# Patient Record
Sex: FEMALE | Race: ASIAN | Employment: UNEMPLOYED | URBAN - METROPOLITAN AREA
[De-identification: names, ages, dates, MRNs, and addresses within clinical notes are randomized per-mention and may not be internally consistent; named-entity substitution may affect disease eponyms.]

---

## 2020-10-20 ENCOUNTER — TELEPHONE (OUTPATIENT)
Dept: CARDIOLOGY CLINIC | Age: 20
End: 2020-10-20

## 2020-10-20 NOTE — TELEPHONE ENCOUNTER
Pt inquiring if documents from her pcp Dr. Delia Sr has been received.  Please advise    [phone]  Phone:

## 2020-10-20 NOTE — TELEPHONE ENCOUNTER
Returned patient's call, 2 pt identifiers used    Advised patient we have received last office note and a monitor report.        Future Appointments   Date Time Provider Dwayne Mercado   11/17/2020  8:40 AM MD NUNU Best AMB

## 2020-11-17 ENCOUNTER — OFFICE VISIT (OUTPATIENT)
Dept: CARDIOLOGY CLINIC | Age: 20
End: 2020-11-17
Payer: COMMERCIAL

## 2020-11-17 VITALS
WEIGHT: 121.6 LBS | OXYGEN SATURATION: 98 % | SYSTOLIC BLOOD PRESSURE: 136 MMHG | BODY MASS INDEX: 20.26 KG/M2 | HEIGHT: 65 IN | HEART RATE: 78 BPM | DIASTOLIC BLOOD PRESSURE: 82 MMHG

## 2020-11-17 DIAGNOSIS — R00.2 PALPITATIONS: ICD-10-CM

## 2020-11-17 DIAGNOSIS — I47.1 SVT (SUPRAVENTRICULAR TACHYCARDIA) (HCC): Primary | ICD-10-CM

## 2020-11-17 PROCEDURE — 99243 OFF/OP CNSLTJ NEW/EST LOW 30: CPT | Performed by: INTERNAL MEDICINE

## 2020-11-17 NOTE — PROGRESS NOTES
Reason for consult: SVT  Requesting physician: Dr. Esau Roldan    HPI: Mayte Charlton, a 23y.o. year-old who presents for evaluation of SVT. Has had palpitations since the fall of 2018  It has become more frequent in the last few months  Episodes can last a few seconds up to 30 minutes  Sometimes she feels her heart pause and might have a brief sensation of chest pressure but it resolves quickly   She denies any dizziness or syncope  She rarely drinks caffeine, no OTC cold/allergy medications, no energy drinks or vitamins  Sleeps 8 hours/night   Stress level a little above average due to school  She denies any dyspnea with exertion or PND  She denies any LE edema  Exercises 3-4 days/week, does HIT workouts, takes walks or jogs, does some strength training  Living on campus at [facility] right now but her family lives in [place]   She is pre-med    Today we spent some time reviewing SVT contributions and precipitators as well as high risk features and when to worry. At this time no treatment is indicated given her underlying bradycardia 40 on her monitor as well as the lack of high risk features syncope etc.  We reviewed ablation as a potential cure for her symptoms if they intensify but will defer any evaluation for ablation at this time. She is well compensated on exam able to exercise without difficulty and has not ever passed out    Assessment/Plan:  1.   SVT - noted on loop monitor 9/20, discussed treatment options which includes medication for suppression and catheter ablation, her loop monitor showed bradycardia down to the 40s so I would not recommend suppression medication at this time  -discussed proceeding with EP consultation to discuss catheter ablation but she does not have any high risk features at this time and the palpitations are not bothering her enough right now for her to want to proceed with a catheter ablation  -discussed symptoms for which to call the office and report   -discussed vagal manuevers for longer episodes  -will order TTE to assess EF and valves   -TSH, K and Mg WNL  -she will follow here PRN    Loop Monitor 9/20 - NSR  bpm, 11 SVT runs, fastest run 167 bpm for 11 beats, longest run was 14 beats at 140 bpm    Fam Hx: no early CAD or arrhythmias   Soc Hx: no tobacco use, no etoh use    She  has no past medical history on file. Cardiovascular ROS: positive for palpitations, no exertional chest pain or dyspnea on exertion  Respiratory ROS: no cough, shortness of breath, or wheezing  Neurological ROS: no TIA or stroke symptoms  All other systems negative except as above. PE  Vitals:    11/17/20 0856   BP: 136/82   Pulse: 78   SpO2: 98%   Weight: 121 lb 9.6 oz (55.2 kg)   Height: 5' 5\" (1.651 m)    Body mass index is 20.24 kg/m².    General appearance - alert, well appearing, and in no distress  Mental status - affect appropriate to mood  Eyes - sclera anicteric, moist mucous membranes  Neck - supple  Lymphatics - not assessed  Chest - clear to auscultation, no wheezes, rales or rhonchi  Heart - normal rate, regular rhythm, normal S1, S2, no murmurs, rubs, clicks or gallops  Abdomen - soft, nontender, nondistended  Back exam - full range of motion, no tenderness  Neurological - cranial nerves II through XII grossly intact, no focal deficit  Musculoskeletal - no muscular tenderness noted, normal strength  Extremities - peripheral pulses normal, no pedal edema  Skin - normal coloration  no rashes    12 lead ECG: NSR     Recent Labs:  No results found for: CHOL, CHOLX, CHLST, CHOLV, 020913, HDL, HDLP, LDL, LDLC, DLDLP, TGLX, TRIGL, TRIGP, CHHD, CHHDX  No results found for: YOSHI, ACREA, CREA, REFC3, REFC4  No results found for: BUN, IBUN, MBUNV, BUNV  No results found for: K, KI, PLK, WBK  No results found for: HBA1C, HGBE8, QUL9BYKN, GHW7TSXL  No results found for: HGB, HGBP, HGBEXT, HGBEXT  No results found for: PLT, PLTEXT, PLTEXT    Reviewed:  No past medical history on file.  Social History     Tobacco Use   Smoking Status Never Smoker   Smokeless Tobacco Never Used     Social History     Substance and Sexual Activity   Alcohol Use Never    Frequency: Never     Allergies no known allergies    No current outpatient medications on file. No current facility-administered medications for this visit.         Brissa Garg MD  [facility] [address] [place] [address] [address], [address] [address]  [facility]  [phone]

## 2020-11-17 NOTE — LETTER
11/17/20 Patient: Aston Fowler YOB: 2000 Date of Visit: 11/17/2020 Heidy Wallace MD 
[facility] [address] VIA Facsimile: [phone] Dear Heidy Wallace MD, Thank you for referring Ms. Aston Fowler to [facility] for evaluation. My notes for this consultation are attached. If you have questions, please do not hesitate to call me. I look forward to following your patient along with you. Sincerely, Jonathan Verdugo MD

## 2020-12-21 ENCOUNTER — ANCILLARY PROCEDURE (OUTPATIENT)
Dept: CARDIOLOGY CLINIC | Age: 20
End: 2020-12-21
Payer: COMMERCIAL

## 2020-12-21 VITALS — HEIGHT: 65 IN | BODY MASS INDEX: 20.16 KG/M2 | WEIGHT: 121 LBS

## 2020-12-21 DIAGNOSIS — R00.2 PALPITATIONS: ICD-10-CM

## 2020-12-21 DIAGNOSIS — I47.1 SVT (SUPRAVENTRICULAR TACHYCARDIA) (HCC): ICD-10-CM

## 2020-12-21 LAB
ECHO AO ASC DIAM: 2.17 CM
ECHO AO ROOT DIAM: 2.33 CM
ECHO AV AREA PEAK VELOCITY: 2.56 CM2
ECHO AV AREA VTI: 2.54 CM2
ECHO AV AREA/BSA PEAK VELOCITY: 1.6 CM2/M2
ECHO AV AREA/BSA VTI: 1.6 CM2/M2
ECHO AV MEAN GRADIENT: 6.33 MMHG
ECHO AV PEAK GRADIENT: 11.75 MMHG
ECHO AV PEAK VELOCITY: 171.39 CM/S
ECHO AV VTI: 34.29 CM
ECHO LA AREA 4C: 12.93 CM2
ECHO LA MAJOR AXIS: 3.05 CM
ECHO LA MINOR AXIS: 1.91 CM
ECHO LA VOL 2C: 32.41 ML (ref 22–52)
ECHO LA VOL 4C: 28.32 ML (ref 22–52)
ECHO LA VOL BP: 36.83 ML (ref 22–52)
ECHO LA VOL/BSA BIPLANE: 23.05 ML/M2 (ref 16–28)
ECHO LA VOLUME INDEX A2C: 20.29 ML/M2 (ref 16–28)
ECHO LA VOLUME INDEX A4C: 17.73 ML/M2 (ref 16–28)
ECHO LV E' LATERAL VELOCITY: 22.21 CM/S
ECHO LV E' SEPTAL VELOCITY: 12.12 CM/S
ECHO LV INTERNAL DIMENSION DIASTOLIC: 4.38 CM (ref 3.9–5.3)
ECHO LV INTERNAL DIMENSION SYSTOLIC: 2.64 CM
ECHO LV IVSD: 0.73 CM (ref 0.6–0.9)
ECHO LV MASS 2D: 99.8 G (ref 67–162)
ECHO LV MASS INDEX 2D: 62.5 G/M2 (ref 43–95)
ECHO LV POSTERIOR WALL DIASTOLIC: 0.77 CM (ref 0.6–0.9)
ECHO LVOT DIAM: 2.14 CM
ECHO LVOT PEAK GRADIENT: 5.94 MMHG
ECHO LVOT PEAK VELOCITY: 121.82 CM/S
ECHO LVOT SV: 87 ML
ECHO LVOT VTI: 24.2 CM
ECHO MV A VELOCITY: 44.83 CM/S
ECHO MV AREA PHT: 3.98 CM2
ECHO MV E DECELERATION TIME (DT): 190.67 MS
ECHO MV E VELOCITY: 135.95 CM/S
ECHO MV E/A RATIO: 3.03
ECHO MV E/E' LATERAL: 6.12
ECHO MV E/E' RATIO (AVERAGED): 8.67
ECHO MV E/E' SEPTAL: 11.22
ECHO MV PRESSURE HALF TIME (PHT): 55.29 MS
ECHO RA AREA 4C: 10.4 CM2
ECHO RA MAJOR AXIS: 3.32 CM
ECHO RV INTERNAL DIMENSION: 3.13 CM
ECHO RV TAPSE: 2.09 CM (ref 1.5–2)
LA VOL DISK BP: 32.72 ML (ref 22–52)
LVOT MG: 3.27 MMHG

## 2020-12-21 PROCEDURE — 93306 TTE W/DOPPLER COMPLETE: CPT | Performed by: INTERNAL MEDICINE

## 2020-12-22 ENCOUNTER — TELEPHONE (OUTPATIENT)
Dept: CARDIOLOGY CLINIC | Age: 20
End: 2020-12-22

## 2020-12-22 NOTE — TELEPHONE ENCOUNTER
----- Message from Tamara Hanson MD sent at 12/21/2020  5:35 PM EST ----- 
Great news this echo looks Interpretation Summary · LV: Estimated LVEF is 60 - 65%. Visually measured ejection fraction. Normal cavity size, wall thickness, systolic function (ejection fraction normal) and diastolic function. · MV: Mitral valve thickening. Additional mitral valve findings: There is an anterior leaflet and a posterior leaflet flail segment noted. · IAS: No atrial septal defect present. Above echo results given to patient. 2 pt identifiers used

## 2022-06-29 ENCOUNTER — HOSPITAL ENCOUNTER (OUTPATIENT)
Age: 22
Setting detail: OBSERVATION
Discharge: HOME | End: 2022-06-30
Attending: EMERGENCY MEDICINE | Admitting: HOSPITALIST
Payer: COMMERCIAL

## 2022-06-29 ENCOUNTER — TRANSCRIBE ORDER (OUTPATIENT)
Dept: SCHEDULING | Age: 22
End: 2022-06-29

## 2022-06-29 ENCOUNTER — APPOINTMENT (OUTPATIENT)
Dept: CT IMAGING | Age: 22
End: 2022-06-29
Attending: EMERGENCY MEDICINE
Payer: COMMERCIAL

## 2022-06-29 DIAGNOSIS — R51.9 NONINTRACTABLE HEADACHE, UNSPECIFIED CHRONICITY PATTERN, UNSPECIFIED HEADACHE TYPE: Primary | ICD-10-CM

## 2022-06-29 DIAGNOSIS — G44.84 PRIMARY EXERTIONAL HEADACHE: Primary | ICD-10-CM

## 2022-06-29 DIAGNOSIS — I67.9 INTRACRANIAL VASCULAR STENOSIS: ICD-10-CM

## 2022-06-29 LAB
ALBUMIN SERPL-MCNC: 3.9 G/DL (ref 3.5–5)
ALBUMIN/GLOB SERPL: 1 {RATIO} (ref 1.1–2.2)
ALP SERPL-CCNC: 33 U/L (ref 45–117)
ALT SERPL-CCNC: 28 U/L (ref 12–78)
ANION GAP SERPL CALC-SCNC: 11 MMOL/L (ref 5–15)
AST SERPL-CCNC: 24 U/L (ref 15–37)
BASOPHILS # BLD: 0 K/UL (ref 0–0.1)
BASOPHILS NFR BLD: 1 % (ref 0–1)
BILIRUB SERPL-MCNC: 0.6 MG/DL (ref 0.2–1)
BUN SERPL-MCNC: 10 MG/DL (ref 6–20)
BUN/CREAT SERPL: 14 (ref 12–20)
CALCIUM SERPL-MCNC: 8.9 MG/DL (ref 8.5–10.1)
CHLORIDE SERPL-SCNC: 104 MMOL/L (ref 97–108)
CO2 SERPL-SCNC: 26 MMOL/L (ref 21–32)
CREAT SERPL-MCNC: 0.71 MG/DL (ref 0.55–1.02)
DIFFERENTIAL METHOD BLD: ABNORMAL
EOSINOPHIL # BLD: 0.4 K/UL (ref 0–0.4)
EOSINOPHIL NFR BLD: 5 % (ref 0–7)
ERYTHROCYTE [DISTWIDTH] IN BLOOD BY AUTOMATED COUNT: 11.7 % (ref 11.5–14.5)
GLOBULIN SER CALC-MCNC: 3.9 G/DL (ref 2–4)
GLUCOSE SERPL-MCNC: 92 MG/DL (ref 65–100)
HCG SERPL QL: NEGATIVE
HCT VFR BLD AUTO: 41.8 % (ref 35–47)
HGB BLD-MCNC: 13.9 G/DL (ref 11.5–16)
IMM GRANULOCYTES # BLD AUTO: 0 K/UL (ref 0–0.04)
IMM GRANULOCYTES NFR BLD AUTO: 1 % (ref 0–0.5)
LYMPHOCYTES # BLD: 1.9 K/UL (ref 0.8–3.5)
LYMPHOCYTES NFR BLD: 24 % (ref 12–49)
MCH RBC QN AUTO: 32.1 PG (ref 26–34)
MCHC RBC AUTO-ENTMCNC: 33.3 G/DL (ref 30–36.5)
MCV RBC AUTO: 96.5 FL (ref 80–99)
MONOCYTES # BLD: 0.6 K/UL (ref 0–1)
MONOCYTES NFR BLD: 7 % (ref 5–13)
NEUTS SEG # BLD: 5.1 K/UL (ref 1.8–8)
NEUTS SEG NFR BLD: 62 % (ref 32–75)
NRBC # BLD: 0 K/UL (ref 0–0.01)
NRBC BLD-RTO: 0 PER 100 WBC
PLATELET # BLD AUTO: 363 K/UL (ref 150–400)
PMV BLD AUTO: 9.8 FL (ref 8.9–12.9)
POTASSIUM SERPL-SCNC: 3.9 MMOL/L (ref 3.5–5.1)
PROT SERPL-MCNC: 7.8 G/DL (ref 6.4–8.2)
RBC # BLD AUTO: 4.33 M/UL (ref 3.8–5.2)
SODIUM SERPL-SCNC: 141 MMOL/L (ref 136–145)
WBC # BLD AUTO: 8 K/UL (ref 3.6–11)

## 2022-06-29 PROCEDURE — G0378 HOSPITAL OBSERVATION PER HR: HCPCS

## 2022-06-29 PROCEDURE — 99285 EMERGENCY DEPT VISIT HI MDM: CPT

## 2022-06-29 PROCEDURE — 80053 COMPREHEN METABOLIC PANEL: CPT

## 2022-06-29 PROCEDURE — 70496 CT ANGIOGRAPHY HEAD: CPT

## 2022-06-29 PROCEDURE — 74011250636 HC RX REV CODE- 250/636: Performed by: EMERGENCY MEDICINE

## 2022-06-29 PROCEDURE — 74011000636 HC RX REV CODE- 636: Performed by: EMERGENCY MEDICINE

## 2022-06-29 PROCEDURE — 36415 COLL VENOUS BLD VENIPUNCTURE: CPT

## 2022-06-29 PROCEDURE — 85025 COMPLETE CBC W/AUTO DIFF WBC: CPT

## 2022-06-29 PROCEDURE — 74011250636 HC RX REV CODE- 250/636: Performed by: INTERNAL MEDICINE

## 2022-06-29 PROCEDURE — 70450 CT HEAD/BRAIN W/O DYE: CPT

## 2022-06-29 PROCEDURE — 65270000029 HC RM PRIVATE

## 2022-06-29 PROCEDURE — 84703 CHORIONIC GONADOTROPIN ASSAY: CPT

## 2022-06-29 RX ORDER — OXYCODONE AND ACETAMINOPHEN 5; 325 MG/1; MG/1
1 TABLET ORAL
Status: DISCONTINUED | OUTPATIENT
Start: 2022-06-29 | End: 2022-06-30 | Stop reason: HOSPADM

## 2022-06-29 RX ORDER — PROCHLORPERAZINE EDISYLATE 5 MG/ML
10 INJECTION INTRAMUSCULAR; INTRAVENOUS
Status: DISCONTINUED | OUTPATIENT
Start: 2022-06-29 | End: 2022-06-30 | Stop reason: HOSPADM

## 2022-06-29 RX ORDER — LEVONORGESTREL AND ETHINYL ESTRADIOL 0.1-0.02MG
1 KIT ORAL
COMMUNITY

## 2022-06-29 RX ORDER — MAGNESIUM SULFATE HEPTAHYDRATE 40 MG/ML
2 INJECTION, SOLUTION INTRAVENOUS
Status: ACTIVE | OUTPATIENT
Start: 2022-06-29 | End: 2022-06-30

## 2022-06-29 RX ORDER — SODIUM CHLORIDE, SODIUM LACTATE, POTASSIUM CHLORIDE, CALCIUM CHLORIDE 600; 310; 30; 20 MG/100ML; MG/100ML; MG/100ML; MG/100ML
100 INJECTION, SOLUTION INTRAVENOUS CONTINUOUS
Status: DISCONTINUED | OUTPATIENT
Start: 2022-06-29 | End: 2022-06-30 | Stop reason: HOSPADM

## 2022-06-29 RX ORDER — MORPHINE SULFATE 2 MG/ML
2 INJECTION, SOLUTION INTRAMUSCULAR; INTRAVENOUS
Status: DISCONTINUED | OUTPATIENT
Start: 2022-06-29 | End: 2022-06-30 | Stop reason: HOSPADM

## 2022-06-29 RX ORDER — DIPHENHYDRAMINE HYDROCHLORIDE 50 MG/ML
50 INJECTION, SOLUTION INTRAMUSCULAR; INTRAVENOUS
Status: ACTIVE | OUTPATIENT
Start: 2022-06-29 | End: 2022-06-30

## 2022-06-29 RX ADMIN — IOPAMIDOL 100 ML: 755 INJECTION, SOLUTION INTRAVENOUS at 19:39

## 2022-06-29 RX ADMIN — SODIUM CHLORIDE 1000 ML: 9 INJECTION, SOLUTION INTRAVENOUS at 17:55

## 2022-06-29 RX ADMIN — SODIUM CHLORIDE, POTASSIUM CHLORIDE, SODIUM LACTATE AND CALCIUM CHLORIDE 100 ML/HR: 600; 310; 30; 20 INJECTION, SOLUTION INTRAVENOUS at 23:02

## 2022-06-29 NOTE — Clinical Note
Patient Class[de-identified] OBSERVATION [104]   Type of Bed: Neuro/Stroke [9]   Cardiac Monitoring Required?: Yes   Reason for Observation: RCVS   Admitting Diagnosis: Headache [[id number]]   Admitting Physician: Tia Pearl [[id number]]   Attending Physician: Tia Pearl [[id number]]

## 2022-06-29 NOTE — ED NOTES
Patient reports pain 3/10 at this time. Patient is ambulatory in Triage, reports no weakness or numbness. Dr. Alycia Dow at the bedside.

## 2022-06-29 NOTE — ED TRIAGE NOTES
Patient arrives ambulatory to the ED with chief complaint of migraine. States the migraine started after doing a set of squats and worsens when she walks or goes up a flight of stairs. Denies fevers, numbness, and tingling.

## 2022-06-29 NOTE — ED NOTES
Bedside and Verbal shift change report given to Quincy Laughlin RN (oncoming nurse) by Jean-Paul Joya RN (offgoing nurse). Report included the following information SBAR, ED Summary, Intake/Output, MAR and Recent Results.

## 2022-06-29 NOTE — ED PROVIDER NOTES
71-year-old female presents with a chief complaint of headache. Patient reports she developed a headache on Monday of last week. Headache started while she was working out. It has been throbbing in nature. She currently rates it a 2 out of 10. She denies focal deficits or visual changes. She denies trauma or fever. She was seen by her primary care physician who referred her for outpatient MRA; however, there were no outpatient appointments for MRI so her family physician referred her to the emergency department. Past Medical History:   Diagnosis Date    Headache     SVT (supraventricular tachycardia) ([facility] [address]        History reviewed. No pertinent surgical history. History reviewed. No pertinent family history. Social History     Socioeconomic History    Marital status: SINGLE     Spouse name: Not on file    Number of children: Not on file    Years of education: Not on file    Highest education level: Not on file   Occupational History    Not on file   Tobacco Use    Smoking status: Never Smoker    Smokeless tobacco: Never Used   Substance and Sexual Activity    Alcohol use: Yes     Comment: Social    Drug use: Never    Sexual activity: Not on file   Other Topics Concern    Not on file   Social History Narrative    Not on file     Social Determinants of Health     Financial Resource Strain:     Difficulty of Paying Living Expenses: Not on file   Food Insecurity:     Worried About Running Out of Food in the Last Year: Not on file    Stephanie of Food in the Last Year: Not on file   Transportation Needs:     Lack of Transportation (Medical): Not on file    Lack of Transportation (Non-Medical):  Not on file   Physical Activity:     Days of Exercise per Week: Not on file    Minutes of Exercise per Session: Not on file   Stress:     Feeling of Stress : Not on file   Social Connections:     Frequency of Communication with Friends and Family: Not on file    Frequency of Social Gatherings with Friends and Family: Not on file    Attends Episcopal Services: Not on file    Active Member of Clubs or Organizations: Not on file    Attends Club or Organization Meetings: Not on file    Marital Status: Not on file   Intimate Partner Violence:     Fear of Current or Ex-Partner: Not on file    Emotionally Abused: Not on file    Physically Abused: Not on file    Sexually Abused: Not on file   Housing Stability:     Unable to Pay for Housing in the Last Year: Not on file    Number of [place] in the Last Year: Not on file    Unstable Housing in the Last Year: Not on file         ALLERGIES: Patient has no known allergies. Review of Systems   Constitutional: Negative for fever. HENT: Negative for rhinorrhea. Respiratory: Negative for shortness of breath. Cardiovascular: Negative for chest pain. Gastrointestinal: Negative for abdominal pain. Genitourinary: Negative for dysuria. Musculoskeletal: Negative for back pain. Skin: Negative for wound. Neurological: Positive for headaches. Psychiatric/Behavioral: Negative for confusion. Vitals:    06/29/22 1716   BP: (!) 140/94   Pulse: 61   Resp: 16   Temp: 97.7 °F (36.5 °C)   SpO2: 99%   Weight: 58.1 kg (128 lb)   Height: 5' 5\" (1.651 m)            Physical Exam  Vitals and nursing note reviewed. Constitutional:       General: She is not in acute distress. Appearance: Normal appearance. She is not ill-appearing, toxic-appearing or diaphoretic. HENT:      Head: Normocephalic and atraumatic. Eyes:      Extraocular Movements: Extraocular movements intact. Cardiovascular:      Rate and Rhythm: Normal rate. Pulses: Normal pulses. Pulmonary:      Effort: Pulmonary effort is normal. No respiratory distress. Abdominal:      General: There is no distension. Musculoskeletal:         General: Normal range of motion. Cervical back: Normal range of motion. Skin:     General: Skin is dry. Neurological:      General: No focal deficit present. Mental Status: She is alert and oriented to person, place, and time. Psychiatric:         Mood and Affect: Mood normal.          MDM  Number of Diagnoses or Management Options  Intracranial vascular stenosis  Nonintractable headache, unspecified chronicity pattern, unspecified headache type  Diagnosis management comments:     29-year-old female presents with 2 weeks of headache. She has a normal neurologic exam.  CT and CTA of the head and neck will be obtained to rule out intracranial hemorrhage or mass. Patient signed out to night attending pending results and disposition. 7 AM  Change of shift. Care of patient signed over to Dr. Tristen Dixon. Bedside handoff complete. Awaiting results and disposition.           Amount and/or Complexity of Data Reviewed  Clinical lab tests: reviewed  Tests in the radiology section of CPT®: reviewed           Procedures

## 2022-06-30 ENCOUNTER — APPOINTMENT (OUTPATIENT)
Dept: MRI IMAGING | Age: 22
End: 2022-06-30
Attending: HOSPITALIST
Payer: COMMERCIAL

## 2022-06-30 VITALS
TEMPERATURE: 98 F | DIASTOLIC BLOOD PRESSURE: 81 MMHG | WEIGHT: 128 LBS | SYSTOLIC BLOOD PRESSURE: 126 MMHG | OXYGEN SATURATION: 98 % | RESPIRATION RATE: 19 BRPM | HEIGHT: 65 IN | HEART RATE: 71 BPM | BODY MASS INDEX: 21.33 KG/M2

## 2022-06-30 PROBLEM — R51.9 HEADACHE: Status: ACTIVE | Noted: 2022-06-30

## 2022-06-30 LAB
CHOLEST SERPL-MCNC: 170 MG/DL
COMMENT, HOLDF: NORMAL
ERYTHROCYTE [SEDIMENTATION RATE] IN BLOOD: 6 MM/HR (ref 0–20)
EST. AVERAGE GLUCOSE BLD GHB EST-MCNC: 105 MG/DL
HBA1C MFR BLD: 5.3 % (ref 4–5.6)
HDLC SERPL-MCNC: 93 MG/DL
HDLC SERPL: 1.8 {RATIO} (ref 0–5)
LDLC SERPL CALC-MCNC: 61.4 MG/DL (ref 0–100)
SAMPLES BEING HELD,HOLD: NORMAL
TRIGL SERPL-MCNC: 78 MG/DL (ref ?–150)
VLDLC SERPL CALC-MCNC: 15.6 MG/DL

## 2022-06-30 PROCEDURE — 86038 ANTINUCLEAR ANTIBODIES: CPT

## 2022-06-30 PROCEDURE — 99222 1ST HOSP IP/OBS MODERATE 55: CPT | Performed by: PSYCHIATRY & NEUROLOGY

## 2022-06-30 PROCEDURE — 80061 LIPID PANEL: CPT

## 2022-06-30 PROCEDURE — 36415 COLL VENOUS BLD VENIPUNCTURE: CPT

## 2022-06-30 PROCEDURE — 86037 ANCA TITER EACH ANTIBODY: CPT

## 2022-06-30 PROCEDURE — 83036 HEMOGLOBIN GLYCOSYLATED A1C: CPT

## 2022-06-30 PROCEDURE — 70551 MRI BRAIN STEM W/O DYE: CPT

## 2022-06-30 PROCEDURE — G0378 HOSPITAL OBSERVATION PER HR: HCPCS

## 2022-06-30 PROCEDURE — 85652 RBC SED RATE AUTOMATED: CPT

## 2022-06-30 RX ORDER — ACETAMINOPHEN 650 MG/1
650 SUPPOSITORY RECTAL
Status: DISCONTINUED | OUTPATIENT
Start: 2022-06-30 | End: 2022-06-30 | Stop reason: HOSPADM

## 2022-06-30 RX ORDER — ACETAMINOPHEN 325 MG/1
650 TABLET ORAL
Status: SHIPPED | COMMUNITY
Start: 2022-06-30

## 2022-06-30 RX ORDER — ACETAMINOPHEN 325 MG/1
650 TABLET ORAL
Status: DISCONTINUED | OUTPATIENT
Start: 2022-06-30 | End: 2022-06-30 | Stop reason: HOSPADM

## 2022-06-30 RX ORDER — KETOROLAC TROMETHAMINE 30 MG/ML
30 INJECTION, SOLUTION INTRAMUSCULAR; INTRAVENOUS
Status: DISCONTINUED | OUTPATIENT
Start: 2022-06-30 | End: 2022-06-30 | Stop reason: HOSPADM

## 2022-06-30 RX ORDER — VERAPAMIL HYDROCHLORIDE 40 MG/1
40 TABLET ORAL 2 TIMES DAILY
Qty: 60 TABLET | Refills: 1 | Status: SHIPPED | OUTPATIENT
Start: 2022-06-30

## 2022-06-30 RX ORDER — VERAPAMIL HYDROCHLORIDE 80 MG/1
40 TABLET ORAL 2 TIMES DAILY
Status: DISCONTINUED | OUTPATIENT
Start: 2022-06-30 | End: 2022-06-30 | Stop reason: HOSPADM

## 2022-06-30 NOTE — DISCHARGE INSTRUCTIONS
Discharge Instructions       PATIENT ID: Eleno Sullivan  MRN: [id number]   YOB: 2000    DATE OF ADMISSION: 6/29/2022  5:35 PM    DATE OF DISCHARGE: 6/30/2022    PRIMARY CARE PROVIDER: Peyton Teran MD     ATTENDING PHYSICIAN: Miguel aBtista MD  DISCHARGING PROVIDER: Lavon Rogers MD    To contact this individual call [phone] and ask the  to page. If unavailable ask to be transferred the Adult Hospitalist Department. DISCHARGE DIAGNOSES ***    CONSULTATIONS: IP CONSULT TO HOSPITALIST  IP CONSULT TO NEUROLOGY  IP CONSULT TO NEUROINTERVENTIONAL SURGERY    PROCEDURES/SURGERIES: * No surgery found *    PENDING TEST RESULTS:   At the time of discharge the following test results are still pending:blood test including VIC, ANCA will call you if there is any abnormal     FOLLOW UP APPOINTMENTS:   Follow-up Information     Follow up With Specialties Details Why Contact Info    Peyton Teran MD Family Medicine Schedule an appointment as soon as possible for a visit   [address] [id number] [phone]      Dandy Marie MD Neurology Schedule an appointment as soon as possible for a visit   [address] [phone]      [address] [place] [address] Emergency Medicine  As needed, If symptoms worsen [address]    Theron Jama MD Neurology In 4 weeks  [address] [facility]  [phone]             ADDITIONAL CARE RECOMMENDATIONS:   Can take tylenol if having headache   Check blood pressure and heart rate before taking Verapamil, please not taking this medicine  If HR< 55 and SBP< 95. DIET: Regular Diet      ACTIVITY: Activity as tolerated        Radiology      DISCHARGE MEDICATIONS:   See Medication Reconciliation Form    · It is important that you take the medication exactly as they are prescribed.    · Keep your medication in the bottles provided by the pharmacist and keep a list of the medication names, dosages, and times to be taken in your wallet. · Do not take other medications without consulting your doctor. NOTIFY YOUR PHYSICIAN FOR ANY OF THE FOLLOWING:   Fever over 101 degrees for 24 hours. Chest pain, shortness of breath, fever, chills, nausea, vomiting, diarrhea, change in mentation, falling, weakness, bleeding. Severe pain or pain not relieved by medications. Or, any other signs or symptoms that you may have questions about. DISPOSITION:    Home With:   OT  PT  HH  RN       SNF/Inpatient Rehab/LTAC    Independent/assisted living    Hospice   x Other:     CDMP Checked:   Yes x     PROBLEM LIST Updated:  Yes x       Signed:   Dalton Pitt MD  6/30/2022  4:04 PM  Thank you for allowing us to provide you with medical care today. We realize that you have many choices for your emergency care needs. We thank you for choosing [facility]. Please choose us in the future for any continued health care needs. The exam and treatment you received in the Emergency Department were for an emergent problem and are not intended as complete care. It is important that you follow up with a doctor, nurse practitioner, or physician's assistant for ongoing care. If your symptoms worsen or you do not improve as expected and you are unable to reach your usual health care provider, you should return to the Emergency Department. We are available 24 hours a day. Please make an appointment with your health care provider(s) for follow up of your Emergency Department visit. Take this sheet with you when you go to your follow-up visit.

## 2022-06-30 NOTE — PROGRESS NOTES
Occupational Therapy  06/30/22    Order acknowledged, chart reviewed. Patient admitted for severe HA, CTA indicating severe L PCA stenosis with findings suspicious for RCVS vs vasculitis with strong recommendation for neuro IR and neurology consult with possible angiogram. Will defer and follow up for OT evaluation once medical plan of care is established and cleared for therapy.      Thank you,   Chloé Velez, OTD, OTR/L

## 2022-06-30 NOTE — PROGRESS NOTES
[age]-year-old female with previous history of SVT who presents to the ED with headache. Patient reports intermittent posterior headache for the past week in the setting of exertion. Since Sunday, patient has been having a mild, constant headache, but is worse with exertion. No neurological deficit. Head CT unremarkable. CTA head and neck demonstrates multifocal worst involving the left posterior cerebral artery with multifocal severe stenoses. Given the otherwise lack of atherosclerotic disease and vascular risk factors, findings are highly suspicious for either reversible cerebral vasoconstriction syndrome (RCVS) or vasculitis. Neurointerventional radiology consultation is recommended. I have discussed with neuro interventionalist, Dr. Jorge Luis Li, who recommends transfer to [facility] under the hospitalist service. Patient will need a neurology consult.   Would consider angiogram.

## 2022-06-30 NOTE — CONSULTS
[address]    Name:  Srinivasa Barker  MR#:  [id number]  :  2000  ACCOUNT #:  [de-identified]  DATE OF SERVICE:  2022      REQUESTING PHYSICIAN:  Delfino Belcher MD    REASON FOR EVALUATION:  Headache. HISTORY OF PRESENT ILLNESS:  The patient is a 80-year-old female with no known past medical history, who states that for the past 10 days or so she has been having headaches off and on and they would come on with strenuous activities such as lifting weights at the gymnasium. Then eventually it progressed on and it was coming on with minor activities such as going up steps, and she discussed this with her PCP. Outpatient MRA was planned, but there were no sooner appointments. She was sent to the Emergency Department and had a CT/CTA of the head and neck done which showed multifocal stenoses intracranially. There was a concern that she may have a reversible cerebral vasoconstriction syndrome and she was transferred to [facility] for higher level of care. Overall, her headache has been fluctuating, but still reports a headache of about 2-3/10 intensity. There is no associated photophobia, nausea, or vomiting. No other focal motor or sensory deficits. No chest pain, shortness of breath, or palpitations. She had an episode of headache for a day or two about a month ago, but it subsided on its own. PAST MEDICAL HISTORY:  As mentioned above. REVIEW OF SYSTEMS:  Negative except as mentioned in the HPI. HOME MEDICATIONS:  Levonorgestrel-ethinyl estradiol tablet daily. ALLERGIES:  NONE. SOCIAL HISTORY:  No history of smoking or alcohol use. 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 PHYSICAL EXAMINATION:  GENERAL:  The patient is alert, fully oriented. VITAL SIGNS:  Blood pressure 126/81, temperature 98, pulse is 71. HEART:  Regular rate and rhythm. CHEST:  Clear.   ABDOMEN:  Soft, nontender. Positive bowel sounds. EXTREMITIES:  No edema. NEUROLOGIC:  Speech is clear. Comprehension is normal.  Pupils are equal, round, reactive. Extraocular movements are full. Face is symmetric. Tongue is midline. Hearing is baseline. Muscle tone and bulk normal.  Strength normal in both upper and lower extremities. DTRs 2/2 and symmetric. Toes downgoing. Sensation intact. Gait normal.    LABORATORY DATA:  CBC and chemistries are unremarkable. CTA of the head and neck shows multifocal stenoses noted in the bilateral A1 segments, A2 segment on the right, and PCA branches bilaterally. CT of the neck does not show any significant stenosis. CT brain does not show any acute abnormalities. ASSESSMENT AND PLAN:  A 28-year-old female presenting with new-onset fluctuating headache for the past 1 week that was brought on with exertion and progressed on to the point that it was coming on with minor activities. There are no other aspects to her history or symptoms and she has a normal neurological exam.  Based on CT angiogram findings, I agree that this is most likely reversible cerebral vasoconstriction syndrome. Recommend checking MRI scan of the brain to rule out any areas of ischemia. She does not want to pursue any invasive testing such as cerebral angiogram with verapamil challenge. Screening lab works including sed rate, VIC screen, etc., have already been ordered. Recommend starting verapamil 40 mg twice daily. If MRI is negative, she could be discharged home. If she develops any neurological deficits, she should seek immediate medical attention. Otherwise, she should get a repeat angiogram in about 4 weeks. Thank you for this consultation.       Cheyenne Cerrato MD      [id number]  D:  06/30/2022 14:00  T:  06/30/2022 16:13  JOB #:  [id number]

## 2022-06-30 NOTE — DISCHARGE SUMMARY
Discharge Summary       PATIENT ID: Lalo Brown  MRN: [id number]   YOB: 2000    DATE OF ADMISSION: 6/29/2022  5:35 PM    DATE OF DISCHARGE: 6/30/2022    PRIMARY CARE PROVIDER: Mark Trotter MD     ATTENDING PHYSICIAN: Baldemar Gabriel MD   DISCHARGING PROVIDER: Delfino Belcher MD    To contact this individual call [phone] and ask the  to page. If unavailable ask to be transferred the Adult Hospitalist Department. CONSULTATIONS: IP CONSULT TO HOSPITALIST  IP CONSULT TO NEUROLOGY  IP CONSULT TO NEUROINTERVENTIONAL SURGERY    PROCEDURES/SURGERIES: * No surgery found *    DISCHARGE DIAGNOSES:    reversible cerebral vasoconstriction syndrome:   24year-old with new onset fluctuating headache for the past week. Neuro and NIS consulted:   Based on CT angiogram findings  this is most likely reversible cerebral vasoconstriction syndrome. Brain MRI negative for   any areas of ischemia. Patient does not want to pursue any invasive testing:  diagnostic cerebral angiogram with verapamil challenge Screening inflammatory labs including sed rate, VIC screen etc. ordered. Start verapamil 40 mg twice daily. Stable to home. Plan d/w neuro and NIS. Can have a Follow-up CT in 3 to 4 weeks per Neuro                  ADDITIONAL CARE RECOMMENDATIONS:   1. Checking bp and HR when initiate verapamil   2. Follow up with neurologist in 3-4 weeks, further refill of meds from either neurologist or pcp   3. Avoid workout now until further cleared by neurologist     NOTIFY YOUR PHYSICIAN FOR ANY OF THE FOLLOWING:   Fever over 101 degrees for 24 hours. Chest pain, shortness of breath, fever, chills, nausea, vomiting, diarrhea, change in mentation, falling, weakness, bleeding. Severe pain or pain not relieved by medications, as well as any other signs or symptoms that you may have questions about.     FOLLOW UP APPOINTMENTS:    Follow-up Information     Follow up With Specialties Details Why Contact Info Danny Campuzano MD Family Medicine Schedule an appointment as soon as possible for a visit   [address] [facility] [phone]      Cat Rosen MD Neurology Schedule an appointment as soon as possible for a visit   [address] [facility] [address] [employer] [phone]      [address] [facility] Emergency Medicine  As needed, If symptoms worsen [address]    Jo Stevenson MD Neurology In 4 weeks  [address] [facility]  [phone]               DIET: Regular Diet    ACTIVITY: Activity as tolerated      DISCHARGE MEDICATIONS:  Current Discharge Medication List      START taking these medications    Details   verapamiL (CALAN) 40 mg tablet Take 1 Tablet by mouth two (2) times a day. Qty: 60 Tablet, Refills: 1  Start date: 6/30/2022      acetaminophen (TYLENOL) 325 mg tablet Take 2 Tablets by mouth every six (6) hours as needed for Fever (For temp greater than or equal to 38.5 C or 101.3 F (Unless hepatic failure or contrindicated). Give first line for fever. ). Start date: 6/30/2022         CONTINUE these medications which have NOT CHANGED    Details   levonorgestrel-ethinyl estradiol (Sronyx) 0.1-20 mg-mcg tab Take 1 Tablet by mouth. DISPOSITION:    Home With:   OT  PT  HH  RN       Long term SNF/Inpatient Rehab    Independent/assisted living    Hospice   x Other:       PATIENT CONDITION AT DISCHARGE:     xFunctional status    Poor     Deconditioned    x Independent      Cognition    x Lucid     Forgetful     Dementia      Catheters/lines (plus indication)    [place]     PICC     PEG    x None      Code status   x  Full code     DNR      PHYSICAL EXAMINATION AT DISCHARGE:  General:          Alert, cooperative, no distress, appears stated age.      HEENT:           Atraumatic, anicteric sclerae, pink conjunctivae                          No oral ulcers, mucosa moist, throat clear, dentition fair  Neck:               Supple, symmetrical  Lungs:             Clear to auscultation bilaterally. No Wheezing or Rhonchi. No rales. Chest wall:      No tenderness  No Accessory muscle use. Heart:              Regular  rhythm,  No  murmur   No edema  Abdomen:        Soft, non-tender. Not distended. Bowel sounds normal  Extremities:     No cyanosis. No clubbing,                            Skin turgor normal, Capillary refill normal  Skin:                Not pale. Not Jaundiced  No rashes   Psych:             Not anxious or agitated.   Neurologic:      Alert, moves all extremities, answers questions appropriately and responds to commands       CHRONIC MEDICAL DIAGNOSES:  Problem List as of 6/30/2022 Never Reviewed          Codes Class Noted - Resolved    Headache ICD-10-CM: R51.9  ICD-9-CM: 784.0  6/30/2022 - Present        Intractable headache ICD-10-CM: R51.9  ICD-9-CM: 784.0  6/29/2022 - Present              Greater than 30  minutes were spent with the patient on counseling and coordination of care    Signed:   Fabien Hopper MD  6/30/2022  4:08 PM

## 2022-06-30 NOTE — PROGRESS NOTES
SLP Contact Note    Update: MRI negative. Will sign off. Consult received and appreciated. Patient chart reviewed. CT negative for acute infarct. Pt passed swallow screen. Will await MRI and can complete evaluation if indicated.       Thank you,  STEPHY WestEd, [address] [facility]  Speech-Language Pathologist

## 2022-06-30 NOTE — ROUTINE PROCESS
Emergency Room Nursing Note        Patient Name: Michelle Grullon      : 2000             MRN: [id number]      Chief Complaint:  Migraine      Admit Diagnosis: Intractable headache [R51.9]      Admitting Provider: Sharron Madison MD      Surgery: * No surgery found *           [facility] at bedside to transport the patient to [facility] ER. Patient is stable and not in distress.          Lines:   Peripheral IV 22 Left Antecubital (Active)   Site Assessment Clean, dry, & intact 22   Phlebitis Assessment 0 22   Infiltration Assessment 0 22   Dressing Status Clean, dry, & intact 22   Dressing Type Transparent 22   Hub Color/Line Status Pink;Flushed 22   Action Taken Blood drawn 22         Signed by: Sohail Sarmiento RN, JOSÉ, BSN, CMSRN                                              2022 at 7:23 AM

## 2022-06-30 NOTE — ED NOTES
Pt's parent's requested CT results. I spoke with patient myself and asked if she'd like me to provide with her CT reports. Pt verbally consented. I printed CT results and provided them to patient to provide to parents if she'd like to.

## 2022-06-30 NOTE — ROUTINE PROCESS
Emergency Room Outgoing Transfer Nursing Note      Verbal and/or Written report given to DOMENICO Wetzel by Joelle hBatia RN on [place] Matter a 24 y.o. female who was admitted on 6/29/2022  5:35 PM and being transferred for routine progression of care. Report consisted of the following information SBAR, Kardex, MAR and Recent Results and the information was reviewed with the receiving nurse. Code Status: Full Code      Chief Complaint: Migraine      Admit Diagnosis: Intractable headache [R51.9]      Admitting Provider: Malgorzata Benoit MD      Surgery: * No surgery found *       Infections: No current active infections      Allergies: Patient has no known allergies.       Current diet: DIET NPO Ice Chips, Sips of Water with Meds      Lines:   Peripheral IV 06/29/22 Left Antecubital (Active)   Site Assessment Clean, dry, & intact 06/29/22 1749   Phlebitis Assessment 0 06/29/22 1749   Infiltration Assessment 0 06/29/22 1749   Dressing Status Clean, dry, & intact 06/29/22 1749   Dressing Type Transparent 06/29/22 1749   Hub Color/Line Status Pink;Flushed 06/29/22 1749   Action Taken Blood drawn 06/29/22 1749                Vital Signs:     Patient Vitals for the past 12 hrs:   Temp Pulse Resp BP SpO2   06/30/22 0700 98 °F (36.7 °C) (!) 57 14 116/67 98 %   06/30/22 0500 -- 72 18 (!) 128/53 93 %   06/30/22 0400 -- -- 17 114/67 97 %   06/30/22 0330 -- -- 16 110/69 96 %   06/30/22 0300 -- -- 16 122/73 --   06/30/22 0230 -- -- 16 117/68 97 %   06/30/22 0200 -- 62 17 120/67 91 %   06/30/22 0130 -- -- 19 115/60 94 %   06/30/22 0100 -- -- 16 121/62 94 %   06/30/22 0030 -- 60 10 125/74 97 %   06/30/22 0015 -- 60 13 120/68 92 %   06/30/22 0000 -- 67 15 130/74 --   06/29/22 2345 -- 68 18 132/69 97 %   06/29/22 2330 -- 72 14 131/71 91 %   06/29/22 2315 -- 73 22 122/75 97 %   06/29/22 2300 -- 68 19 135/75 90 %   06/29/22 2230 -- 80 15 (!) 140/97 --   06/29/22 2200 -- -- 15 129/81 98 %   06/29/22 2100 -- -- -- (!) 145/104 97 %           Intake & Output:       Intake/Output Summary (Last 24 hours) at 6/30/2022 0121  Last data filed at 6/29/2022 2337  Gross per 24 hour   Intake 1058.33 ml   Output --   Net 1058.33 ml          Laboratory Results:   No results found for this or any previous visit (from the past 12 hour(s)). Patient transported with : [employer] for questions and clarifications were provided.          Joelle Bhatia RN, JOSÉ, BSN, VIA [facility]     6/30/2022, 7:22 AM

## 2022-06-30 NOTE — ED TRIAGE NOTES
Pt arrives via transfer from [facility] [place]y consult. Pt reports migraine headache severity 3/10, denies sensitivity to light/sound, denies n/v. A&O x4, clear speech, follows commands, NAD.

## 2022-06-30 NOTE — H&P
[address] [facility] Adult  Hospitalist Group  History and Physical    Date of Service:  6/30/2022  Primary Care Provider: Thea Perez MD  Source of information: The patient    Chief Complaint: severe headache     History of Presenting Illness:   Soniya Domínguez is a 24 y.o. female who presents withheadache    pt is transferred from [facility]. 30-year-old female presented to [facility] yesterday afternoon with a chief complaint of headache. Patient reports she developed a headache on Monday of last week. Headache started while she was working out/doing  [employer] . It has been throbbing in nature. Headache was lasted about 5 to 6 hours, then on Sunday had a recurrent headache, described as the worst headache ever . That was also after a moderate to workout at the gym . The severe headache lasted also about a 5 to 6 hours, then the headache slightly better, in the last few days has persistent moderate headache . Due to this, She was seen by her primary care physician who referred her for outpatient MRA; however, there were no outpatient appointments for MRI so her family physician referred her to the emergency department yesterday . She was nausea when she first developed of the severe headache . Currently no more nauseous, denied vision changes, denied any weakness . she currently rates it a 2 out of 10. She denies focal deficits or visual changes. She denies trauma or fever. Denied rash, denied a joint pain, denied ulcers in mouth, denied skin photosensitivity. Denied a drug abuse     REVIEW OF SYSTEMS:  General: HPI, no change of weight, appetite  HEENT: HPI  no nose discharge, no hearing changes   RES: no wheezing, no cough, no sob  CVS: no cp, no palpitation.   Muscular: no joint swelling, no muscle pain, no leg swelling  Skin: no rash, no itching   GI: no vomiting, no diarrhea  : no dysuria, no hematuria  Hemo: no gum bleeding, no petechial   Neuro: HPI   Endo: no polydipsia   Psych: denied depression     Past Medical History:   Diagnosis Date    Headache     SVT (supraventricular tachycardia) ([facility] [address]       History reviewed. No pertinent surgical history. Prior to Admission medications    Medication Sig Start Date End Date Taking? Authorizing Provider   levonorgestrel-ethinyl estradiol (Sronyx) 0.1-20 mg-mcg tab Take 1 Tablet by mouth. Yes Other, MD [facility]     No Known Allergies   History reviewed. No pertinent family history. Social History:  reports that she has never smoked. She has never used smokeless tobacco. She reports current alcohol use. She reports that she does not use drugs. Family and social history were personally reviewed, all pertinent and relevant details are outlined as above. Objective:     Visit Vitals  /71   Pulse 63   Temp 98 °F (36.7 °C)   Resp 16   Ht 5' 5\" (1.651 m)   Wt 58.1 kg (128 lb)   SpO2 97%   BMI 21.30 kg/m²      O2 Device: None (Room air)    PHYSICAL EXAM:   General: Alert x oriented x 3, awake, no acute distress. HEENT: PEERL, EOMI, moist mucus membranes  Neck: Supple, no JVD, no meningeal signs  Chest: Clear to auscultation bilaterally   CVS: RRR, S1 S2 heard, no murmurs/rubs/gallops  Abd: Soft, non-tender, non-distended, +bowel sounds   Ext: No clubbing, no cyanosis, no edema  Neuro/Psych: Pleasant mood and affect, CN 2-12 grossly intact, sensory grossly within normal limit, Strength 5/5 in all extremities, DTR 1+ x 4  Cap refill: Brisk, less than 3 seconds  Pulses: 2+, symmetric in all extremities  Skin: Warm, dry, without rashes or lesions    Data Review: All diagnostic labs and studies have been reviewed. Abnormal Labs Reviewed   CBC WITH AUTOMATED DIFF - Abnormal; Notable for the following components:       Result Value    IMMATURE GRANULOCYTES 1 (*)     All other components within normal limits   METABOLIC PANEL, COMPREHENSIVE - Abnormal; Notable for the following components:    Alk.  phosphatase 33 (*)     A-G Ratio 1.0 (*)     All other components within normal limits       All Micro Results     None          IMAGING:   CT HEAD WO CONT   Final Result   1. No evidence of acute intracranial abnormality. CTA HEAD NECK W CONT   Final Result      CTA Head:   1. Multifocal stenoses as described above, worst involving the left posterior   cerebral artery with multifocal severe stenoses. Given the otherwise lack of   atherosclerotic disease and vascular risk factors, findings are highly   suspicious for either reversible cerebral vasoconstriction syndrome (RCVS) or   vasculitis. Neurointerventional radiology consultation is recommended. CTA Neck:   1. No evidence of significant stenosis. The findings were called to ER on 6/29/2022 7:56 PM by Ac Starr MD.  [id number]              ECG/ECHO:  No results found for this or any previous visit. Assessment:   Given the patient's current clinical presentation, there is a high level of concern for decompensation if discharged from the emergency department. Complex decision making was performed, which includes reviewing the patient's available past medical records, laboratory results, and imaging studies. Active Problems:    Intractable headache (6/29/2022)      Plan:   1. Headache: may due to RCVS. The abnormal CTA head reviewed. pending Neuro and IR consult. No other symptoms of vasculitis, but will check ESR, VIC, ANCA. Plan discussed with father, pt. DIET: ADULT DIET Regular   ISOLATION PRECAUTIONS: There are currently no Active Isolations  CODE STATUS: Full Code   DVT PROPHYLAXIS: SCDs  FUNCTIONAL STATUS PRIOR TO HOSPITALIZATION: Fully active and ambulatory; able to carry on all self-care without restriction. EARLY MOBILITY ASSESSMENT: Recommend routine ambulation while hospitalized with the assistance of nursing staff  ANTICIPATED DISCHARGE: less than 24 hours.   EMERGENCY CONTACT/SURROGATE DECISION MAKER: pt makes her own decision     CRITICAL CARE WAS PERFORMED FOR THIS ENCOUNTER: NO.      Signed By: Summer Mendez MD     June 30, 2022         Please note that this dictation may have been completed with Dragon, the computer voice recognition software. Quite often unanticipated grammatical, syntax, homophones, and other interpretive errors are inadvertently transcribed by the computer software. Please disregard these errors. Please excuse any errors that have escaped final proofreading.

## 2022-06-30 NOTE — PROGRESS NOTES
Physical Therapy - defer  Order acknowledged, chart reviewed. Patient admitted for severe HA, CTA indicating severe L PCA stenosis with findings suspicious for RCVS vs vasculitis with strong recommendation for neuro IR and neurology consult with possible angiogram. Will defer and follow up for evaluation once medical plan of care is established and cleared for therapy activity.

## 2022-06-30 NOTE — CONSULTS
Airway    Date/Time: 2/2/2021 8:22 AM  Performed by: Shabbir Raymond M.D.  Authorized by: Shabbir Raymond M.D.     Location:  OR  Urgency:  Elective  Difficult Airway: No    Indications for Airway Management:  Anesthesia      Spontaneous Ventilation: absent    Sedation Level:  Deep  Preoxygenated: Yes    Mask Difficulty Assessment:  0 - not attempted  Final Airway Type:  Supraglottic airway  Final Supraglottic Airway:  Standard LMA    SGA Size:  5  Number of Attempts at Approach:  1           Consult dictated. 63-year-old with new onset fluctuating headache for the past week. Based on CT angiogram findings I agree that this is most likely reversible cerebral vasoconstriction syndrome. Check MRI brain to rule out any areas of ischemia. Patient does not want to pursue any invasive testing such as angiogram.  Screening inflammatory labs including sed rate, VIC screen etc. ordered. Start verapamil 40 mg twice daily. If MRI is negative, she could be discharged home.   Follow-up CT in 3 to 4 weeks  Patricia Delgado MD

## 2022-07-01 LAB
ANA SER QL: NEGATIVE
C-ANCA TITR SER IF: NORMAL TITER
MYELOPEROXIDASE AB SER IA-ACNC: <0.2 UNITS (ref 0–0.9)
P-ANCA ATYPICAL TITR SER IF: NORMAL TITER
P-ANCA TITR SER IF: NORMAL TITER
PROTEINASE3 AB SER IA-ACNC: <0.2 UNITS (ref 0–0.9)

## 2022-08-09 NOTE — PROGRESS NOTES
Pt was signed out to me for headache and awaiting CTA head and neck. CTA was concerning for reversible cerebral vasoconstriction syndrome. I discussed with neurointerventionalist, neurologist, and hospitalist at [facility] who recommended transfer to [address] [place] [address] for further work-up. I had extensive conversation with pt and family who agreed with transfer for further work-up. Pt without neurological deficit at the time of my evaluation.